# Patient Record
Sex: FEMALE | Race: BLACK OR AFRICAN AMERICAN | NOT HISPANIC OR LATINO | Employment: OTHER | URBAN - METROPOLITAN AREA
[De-identification: names, ages, dates, MRNs, and addresses within clinical notes are randomized per-mention and may not be internally consistent; named-entity substitution may affect disease eponyms.]

---

## 2018-11-14 ENCOUNTER — HISTORICAL (OUTPATIENT)
Dept: RADIOLOGY | Facility: HOSPITAL | Age: 38
End: 2018-11-14

## 2018-11-14 LAB — B-HCG SERPL QL: NEGATIVE

## 2019-08-12 ENCOUNTER — HISTORICAL (OUTPATIENT)
Dept: RESPIRATORY THERAPY | Facility: HOSPITAL | Age: 39
End: 2019-08-12

## 2019-09-05 ENCOUNTER — HISTORICAL (OUTPATIENT)
Dept: RADIOLOGY | Facility: HOSPITAL | Age: 39
End: 2019-09-05

## 2019-12-18 ENCOUNTER — HISTORICAL (OUTPATIENT)
Dept: ADMINISTRATIVE | Facility: HOSPITAL | Age: 39
End: 2019-12-18

## 2019-12-18 LAB
APPEARANCE, UA: ABNORMAL
BACTERIA #/AREA URNS AUTO: ABNORMAL /HPF
BILIRUB UR QL STRIP: NEGATIVE
COLOR UR: ABNORMAL
GLUCOSE (UA): NEGATIVE
HGB UR QL STRIP: NEGATIVE
HYALINE CASTS #/AREA URNS LPF: ABNORMAL /LPF
KETONES UR QL STRIP: ABNORMAL
LEUKOCYTE ESTERASE UR QL STRIP: 25 LEU/UL
NITRITE UR QL STRIP: NEGATIVE
PH UR STRIP: 5.5 [PH] (ref 4.5–8)
PROT UR QL STRIP: 10 MG/DL
RBC #/AREA URNS AUTO: ABNORMAL /HPF
SP GR UR STRIP: 1.03 (ref 1–1.03)
SQUAMOUS #/AREA URNS LPF: ABNORMAL /LPF
UROBILINOGEN UR STRIP-ACNC: NORMAL
WBC #/AREA URNS AUTO: ABNORMAL /HPF

## 2019-12-21 LAB — FINAL CULTURE: NO GROWTH

## 2022-04-11 ENCOUNTER — HISTORICAL (OUTPATIENT)
Dept: ADMINISTRATIVE | Facility: HOSPITAL | Age: 42
End: 2022-04-11

## 2022-04-24 VITALS
WEIGHT: 177.94 LBS | HEIGHT: 63 IN | DIASTOLIC BLOOD PRESSURE: 94 MMHG | BODY MASS INDEX: 31.53 KG/M2 | SYSTOLIC BLOOD PRESSURE: 134 MMHG

## 2022-05-04 NOTE — HISTORICAL [facility] CERNER
This is a historical note converted from Mario. Formatting and pictures may have been removed.  Please reference C[facility]s for original formatting and attached multimedia. Chief Complaint  C referral for AUB, heavy bleeding, cramping and painful urination. Periods last 2-3 days with severe cramps heavy flow for first day  History of Present Illness  40yo G0 presents as referral for dysmenorrhea and menorrhagia, as well as pain with urination. Of note, patient states that she has suffered from both problems for many many years and was seeing a gynecologist in [facility]. She will be moving to [place] next week.  ?  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 Went 9 years without a cycle in her 20s, was put on multiple different types of OCPs but would have an allergic reaction with swelling.  --has had hair on her chin requiring shaving since her 20s  --Now has monthly periods. bleeding and pain bad on first day. has to stay home from work. bleeds 3 pads/hr on first day. bleeds 2-3 days. Has never required transfusion.  --Sexually active with male partner, no concern for new STDs. no h/o abnormal pap smears  ?  Urinary symptoms:  --Frequently has pain with urination, most recently has had increased pain with urination for past several months. Describes pressure sensation, not really improved with urination. No increased frequency. No gross hematuria.  --takes Flomax daily to help her with urination  --History of left hydronephrosis--suspected congenital  ?  (08/01/2018 14:17 CDT CT Abdomen and Pelvis W/O Contrast)  KIDNEYS: The right kidney demonstrates no stone, hydronephrosis,  or hydroureter. No focal mass identified. The left kidney is  again  hydronephrotic with possible stricture at the ureteropelvic junction  (series 601 image 61). There is no obstructing stone identified. 4 mm  nonobstructing left superior pole stone is noted. [1]  ?   (09/05/2019 10:26 CDT US Pelvic Non-Obsetrics)  Anteverted uterus measures 9 cm in length. The endometrium measures 9  mm which is normal for age. There are nabothian cysts.  ?  Transvaginally the right ovary measures 3.5 x 2.5 x 2.2 cm. Left ovary  only seen transabdominally where it measures 4.9 x 4.6 x 2.9 cm. There  is no suspicious ovarian lesion.  ?  Small amount of free fluid is likely physiologic. [2]  Review of Systems  CONSTITUTIONAL: No weight loss, fever, chills, weakness or fatigue. Can carry out normal activities of daily living without SOB or CP.  HEENT: No visual loss, blurred vision, double vision. No hearing loss, sneezing, congestion, runny nose or sore throat.?  SKIN: No rash or itching.?  CARDIOVASCULAR: No chest pain, chest pressure or chest discomfort. No palpitations or edema.?  RESPIRATORY: No shortness of breath, cough or sputum.?  GASTROINTESTINAL: No anorexia, nausea, vomiting or diarrhea. No abdominal pain. No constipation or blood in stool.  GENITOURINARY:?+per HPI. No abnormal vaginal discharge, itching or irritation.  NEUROLOGICAL: No headache, dizziness, syncope, numbness or tingling in the extremities. No change in bowel or bladder control.?  MUSCULOSKELETAL: No muscle, back pain, joint pain or stiffness.?  HEMATOLOGIC: No anemia, bleeding or bruising.?  LYMPHATICS: No enlarged nodes.  PSYCHIATRIC: No current depression or anxiety.  ENDOCRINOLOGIC: No reports of sweating, cold or heat intolerance. No polyuria or polydipsia.?  ALLERGIES: No history of asthma, hives, eczema or rhinitis.  Physical Exam  Vitals & Measurements  T:?36.6? ?C (Oral)? HR:?101(Peripheral)? RR:?20? BP:?134/94?  HT:?160?cm? WT:?80.7?kg? BMI:?31.52? LMP:?11/17/2019 00:00 CST?  General: NAD, A/Ox3  HEENT: dark  stubble on chin  Neck: supple, no thyromegaly palpated, no lymphadenopathy palpated  Respiratory: non-labored breathing, no cough or SOB  Cardiovascular: RRR, extremities well-perfused  Abdomen: soft, obese, nondistended, nontender to palpation, no masses palpated. well-healed Pfannenstiel incision  Extremities: no edema, no calf tenderness  External genitalia: Normal female anatomy, no masses/lesions. Normal appearing urethral meatus. Normal appearing external anus. +significant tenderness produced with q-tip touch of urethra  Single digit exam:?no tenderness at the introitus. Non-tender levator ani, obturator, piriformis muscles bilaterally. +Tenderness at urethra, no tenderness at bladder base.No vaginal sidewall tenderness. Cervix mobile, nontender. Uterosacrals tender to palpation bilaterally.  Bimanual exam: 8 week size uterus, no fundal tenderness. Adnexa no masses or tenderness.  Speculum exam: Moist rugated vaginal mucosa with no gross blood or discharge in the vaginal vault. Cervix normal in appearance, no lesions or masses  Rectal: Normal tone, no masses or tenderness. No nodularities.  Assessment/Plan  40yo G0 presents as referral for dysmenorrhea and menorrhagia, as well as pain with urination. Likely component of chronic pain syndrome; patient will likely need close and regular follow up to evaluate multiple etiologies of pain, however patient is about to move out of state, so ability for follow up will be limited in this clinic. Will initiate workup for her to continue with new provider.  --AUB: periods are monthly, lasting 2-3 days, subjectively heavy. Discussed medical options, although patient is hesitant given reported allergy to OCPs. Briefly discussed surgical options, but since patient is about to move out of state she would like to pursue treatment with a new physician at her new home.  --Dysmenorrhea: control of cycle will likely improve dysmenorrhea. patient was told to avoid NSAIDS?because  of kidneys, although kidney function appears to be normal. Recommended scheduled Tylenol with occasional NSAID for pain control, heating pad, etc.?  --Dysuria: possible component of interstitial cystitis, recommended that patient keep voiding diary and discussed bladder irritants. collected cath UA and culture  --Urethritis: pronounced tenderness on exam. Will start trial of clobetasol ointment.  ?   Patient will follow up PRN.  Discussed with Dr. Pickard  ?   Citlali Estrada MD  [facility], [facility]   Problem List/Past Medical History  Ongoing  Obesity  Psoriasis  Stenosis of ureteropelvic junction  Historical  COPD type A  Seasonal allergies  Procedure/Surgical History  Monitoring of Respiratory Flow, External Approach (08/12/2019)  Monitoring of Respiratory Volume, External Approach (08/12/2019)  Spirometry, including graphic record, total and timed vital capacity, expiratory flow rate measurement(s), with or without maximal voluntary ventilation (08/12/2019)  T&A   Medications  albuterol 0.083% inhalation solution, 2.5 mg= 3 mL, INH, q6hr, PRN, 1 refills  Anoro Ellipta 62.5 mcg-25 mcg/inh inhalation powder, 1 puff(s), INH, Daily, 11 refills  cetirizine 10 mg oral tablet, See Instructions, 11 refills  Flomax 0.4 mg oral capsule, 0.4 mg= 1 cap(s), Oral, Daily, 11 refills  Flonase 50 mcg/inh nasal spray, 2 spray(s), Nasal, BID, 11 refills  hydrochlorothiazide-lisinopril 25 mg-20 mg oral tablet, 0.5 tab, Oral, Daily, 11 refills  ibuprofen 800 mg oral tablet, 800 mg= 1 tab(s), Oral, TID  Linzess 145 mcg oral capsule, 145 mcg= 1 cap(s), Oral, Daily  Linzess 72 mcg oral capsule, See Instructions, 2 refills  potassium chloride 10 mEq oral CAPSULE extended release, 10 mEq= 1 cap(s), Oral, Daily  ProAir HFA 90 mcg/inh inhalation aerosol, 2 puff(s), INH, QID  Allergies  Chocolate?(Itching)  Latex  Lortab?(hives)  Nuts  Tomatoes  iodine  Social History  Abuse/Neglect  No, No, Yes, 07/25/2019  No, 07/09/2019  Alcohol  Past,  08/01/2018  Employment/School  disabled, Work/School description: disabled. Highest education level: University degree(s). Operates hazardous equipment: No., 11/07/2018  Exercise  Self assessment: Poor condition., 11/07/2018  Nutrition/Health  Regular, 11/07/2018  Sexual  Sexually active: Yes. Gender Identity Identifies as female., 07/25/2019  Substance Use  Never, 08/01/2018  Tobacco  Never (less than 100 in lifetime), N/A, 12/18/2019  Family History  Diabetes mellitus type 2: Mother.  Hypertension.: Mother.  Immunizations  Vaccine Date Status   tetanus/diphtheria/pertussis, acel(Tdap) 12/13/2013 Recorded   Health Maintenance  Health Maintenance  ???Pending?(in the next year)  ??? ??OverDue  ??? ? ? ?COPD Maintenance-Spirometry due??and every?  ??? ? ? ?Diabetes Screening due??and every?  ??? ? ? ?Cervical Cancer Screening due??04/10/11??and every 3??year(s)  ??? ? ? ?Alcohol Misuse Screening due??01/01/19??and every 1??year(s)  ??? ??Due?  ??? ? ? ?Influenza Vaccine due??12/18/19??and every?  ??? ??Due In Future?  ??? ? ? ?Obesity Screening not due until??01/01/20??and every 1??year(s)  ??? ? ? ?Hypertension Management-BMP not due until??07/08/20??and every 1??year(s)  ??? ? ? ?ADL Screening not due until??08/20/20??and every 1??year(s)  ??? ? ? ?Blood Pressure Screening not due until??12/17/20??and every 1??year(s)  ??? ? ? ?Body Mass Index Check not due until??12/17/20??and every 1??year(s)  ??? ? ? ?Hypertension Management-Blood Pressure not due until??12/17/20??and every 1??year(s)  ???Satisfied?(in the past 1 year)  ??? ??Satisfied?  ??? ? ? ?ADL Screening on??08/20/19.??Satisfied by Garry Choudhury LPN  ??? ? ? ?Blood Pressure Screening on??12/18/19.??Satisfied by Karen Caro LPN  ??? ? ? ?Body Mass Index Check on??12/18/19.??Satisfied by Karen Caro LPN  ??? ? ? ?COPD Maintenance-Spirometry on??08/12/19.??Satisfied by Aurea Snigh  ??? ? ? ?Depression Screening  on??08/20/19.??Satisfied by Kei MAGAÑA, Garry Brewster  ??? ? ? ?Diabetes Screening on??07/09/19.??Satisfied by Rylee English  ??? ? ? ?Hypertension Management-Blood Pressure on??12/18/19.??Satisfied by Karen Caro LPN  ??? ? ? ?Obesity Screening on??12/18/19.??Satisfied by Karen Caro LPN  ?     [1]?CT Abdomen and Pelvis W/O Contrast; Marito HOLLY, Danilo Issa 08/01/2018 14:17 CDT  [2]?US Pelvic Non-Obsetrics; Hank HOLLY, Rashel Rider 09/05/2019 10:26 CDT   Reviewed the patients medical history, residents findings on physical exam, and the diagnosis with treatment plan. Care provided was reasonable and necessary.

## 2023-09-08 ENCOUNTER — HOSPITAL ENCOUNTER (EMERGENCY)
Facility: HOSPITAL | Age: 43
Discharge: HOME | End: 2023-09-08
Attending: STUDENT IN AN ORGANIZED HEALTH CARE EDUCATION/TRAINING PROGRAM
Payer: COMMERCIAL

## 2023-09-08 VITALS
RESPIRATION RATE: 18 BRPM | OXYGEN SATURATION: 99 % | WEIGHT: 174 LBS | SYSTOLIC BLOOD PRESSURE: 120 MMHG | HEIGHT: 63 IN | TEMPERATURE: 99 F | DIASTOLIC BLOOD PRESSURE: 80 MMHG | HEART RATE: 90 BPM | BODY MASS INDEX: 30.83 KG/M2

## 2023-09-08 DIAGNOSIS — R31.9 URINARY TRACT INFECTION WITH HEMATURIA, SITE UNSPECIFIED: ICD-10-CM

## 2023-09-08 DIAGNOSIS — N39.0 URINARY TRACT INFECTION WITH HEMATURIA, SITE UNSPECIFIED: ICD-10-CM

## 2023-09-08 DIAGNOSIS — N99.522 NEPHROSTOMY TUBE FAILURE WITH SUBSEQUENT URINE LEAK: Primary | ICD-10-CM

## 2023-09-08 LAB
ALBUMIN SERPL-MCNC: 3.8 G/DL (ref 3.5–5)
ALBUMIN/GLOB SERPL: 1 RATIO (ref 1.1–2)
ALP SERPL-CCNC: 74 UNIT/L (ref 40–150)
ALT SERPL-CCNC: 17 UNIT/L (ref 0–55)
APPEARANCE UR: ABNORMAL
AST SERPL-CCNC: 14 UNIT/L (ref 5–34)
B-HCG SERPL QL: NEGATIVE
BACTERIA #/AREA URNS AUTO: ABNORMAL /HPF
BASOPHILS # BLD AUTO: 0.05 X10(3)/MCL
BASOPHILS NFR BLD AUTO: 0.7 %
BILIRUB SERPL-MCNC: 0.3 MG/DL
BILIRUB UR QL STRIP.AUTO: NEGATIVE
BUN SERPL-MCNC: 11.4 MG/DL (ref 7–18.7)
CALCIUM SERPL-MCNC: 9.6 MG/DL (ref 8.4–10.2)
CAOX CRY URNS QL MICRO: ABNORMAL /HPF
CHLORIDE SERPL-SCNC: 107 MMOL/L (ref 98–107)
CO2 SERPL-SCNC: 27 MMOL/L (ref 22–29)
COLOR UR: YELLOW
CREAT SERPL-MCNC: 0.76 MG/DL (ref 0.55–1.02)
EOSINOPHIL # BLD AUTO: 0.06 X10(3)/MCL (ref 0–0.9)
EOSINOPHIL NFR BLD AUTO: 0.8 %
ERYTHROCYTE [DISTWIDTH] IN BLOOD BY AUTOMATED COUNT: 15.8 % (ref 11.5–17)
GFR SERPLBLD CREATININE-BSD FMLA CKD-EPI: >60 MLS/MIN/1.73/M2
GLOBULIN SER-MCNC: 3.7 GM/DL (ref 2.4–3.5)
GLUCOSE SERPL-MCNC: 123 MG/DL (ref 74–100)
GLUCOSE UR QL STRIP.AUTO: NEGATIVE
HCT VFR BLD AUTO: 36.4 % (ref 37–47)
HGB BLD-MCNC: 11.3 G/DL (ref 12–16)
IMM GRANULOCYTES # BLD AUTO: 0.03 X10(3)/MCL (ref 0–0.04)
IMM GRANULOCYTES NFR BLD AUTO: 0.4 %
KETONES UR QL STRIP.AUTO: NEGATIVE
LEUKOCYTE ESTERASE UR QL STRIP.AUTO: ABNORMAL
LYMPHOCYTES # BLD AUTO: 2.53 X10(3)/MCL (ref 0.6–4.6)
LYMPHOCYTES NFR BLD AUTO: 35.2 %
MCH RBC QN AUTO: 24.5 PG (ref 27–31)
MCHC RBC AUTO-ENTMCNC: 31 G/DL (ref 33–36)
MCV RBC AUTO: 78.8 FL (ref 80–94)
MONOCYTES # BLD AUTO: 0.29 X10(3)/MCL (ref 0.1–1.3)
MONOCYTES NFR BLD AUTO: 4 %
NEUTROPHILS # BLD AUTO: 4.22 X10(3)/MCL (ref 2.1–9.2)
NEUTROPHILS NFR BLD AUTO: 58.9 %
NITRITE UR QL STRIP.AUTO: POSITIVE
NRBC BLD AUTO-RTO: 0 %
PH UR STRIP.AUTO: 7 [PH]
PLATELET # BLD AUTO: 370 X10(3)/MCL (ref 130–400)
PMV BLD AUTO: 10.1 FL (ref 7.4–10.4)
POTASSIUM SERPL-SCNC: 3.9 MMOL/L (ref 3.5–5.1)
PROT SERPL-MCNC: 7.5 GM/DL (ref 6.4–8.3)
PROT UR QL STRIP.AUTO: ABNORMAL
RBC # BLD AUTO: 4.62 X10(6)/MCL (ref 4.2–5.4)
RBC #/AREA URNS AUTO: ABNORMAL /HPF
RBC UR QL AUTO: ABNORMAL
SODIUM SERPL-SCNC: 142 MMOL/L (ref 136–145)
SP GR UR STRIP.AUTO: 1.01 (ref 1–1.03)
SQUAMOUS #/AREA URNS AUTO: ABNORMAL /HPF
UROBILINOGEN UR STRIP-ACNC: 1
WBC # SPEC AUTO: 7.18 X10(3)/MCL (ref 4.5–11.5)
WBC #/AREA URNS AUTO: ABNORMAL /HPF

## 2023-09-08 PROCEDURE — 85025 COMPLETE CBC W/AUTO DIFF WBC: CPT | Performed by: PHYSICIAN ASSISTANT

## 2023-09-08 PROCEDURE — 25000003 PHARM REV CODE 250: Performed by: STUDENT IN AN ORGANIZED HEALTH CARE EDUCATION/TRAINING PROGRAM

## 2023-09-08 PROCEDURE — 99284 EMERGENCY DEPT VISIT MOD MDM: CPT | Mod: 25

## 2023-09-08 PROCEDURE — 87088 URINE BACTERIA CULTURE: CPT | Performed by: PHYSICIAN ASSISTANT

## 2023-09-08 PROCEDURE — 81025 URINE PREGNANCY TEST: CPT | Performed by: PHYSICIAN ASSISTANT

## 2023-09-08 PROCEDURE — 87040 BLOOD CULTURE FOR BACTERIA: CPT | Performed by: STUDENT IN AN ORGANIZED HEALTH CARE EDUCATION/TRAINING PROGRAM

## 2023-09-08 PROCEDURE — 81001 URINALYSIS AUTO W/SCOPE: CPT | Performed by: PHYSICIAN ASSISTANT

## 2023-09-08 PROCEDURE — 87077 CULTURE AEROBIC IDENTIFY: CPT | Performed by: PHYSICIAN ASSISTANT

## 2023-09-08 PROCEDURE — 80053 COMPREHEN METABOLIC PANEL: CPT | Performed by: PHYSICIAN ASSISTANT

## 2023-09-08 RX ORDER — HYDROCODONE BITARTRATE AND ACETAMINOPHEN 10; 325 MG/1; MG/1
1 TABLET ORAL
Status: COMPLETED | OUTPATIENT
Start: 2023-09-08 | End: 2023-09-08

## 2023-09-08 RX ORDER — CEFDINIR 300 MG/1
300 CAPSULE ORAL 2 TIMES DAILY
Qty: 20 CAPSULE | Refills: 0 | Status: SHIPPED | OUTPATIENT
Start: 2023-09-08 | End: 2023-09-18

## 2023-09-08 RX ORDER — HYDROCODONE BITARTRATE AND ACETAMINOPHEN 5; 325 MG/1; MG/1
1 TABLET ORAL EVERY 8 HOURS PRN
Qty: 15 TABLET | Refills: 0 | Status: SHIPPED | OUTPATIENT
Start: 2023-09-08 | End: 2023-09-13

## 2023-09-08 RX ORDER — CEFDINIR 300 MG/1
300 CAPSULE ORAL ONCE
Status: COMPLETED | OUTPATIENT
Start: 2023-09-08 | End: 2023-09-08

## 2023-09-08 RX ORDER — ACETAMINOPHEN 500 MG
1000 TABLET ORAL
Status: DISCONTINUED | OUTPATIENT
Start: 2023-09-08 | End: 2023-09-08

## 2023-09-08 RX ADMIN — CEFDINIR 300 MG: 300 CAPSULE ORAL at 06:09

## 2023-09-08 RX ADMIN — HYDROCODONE BITARTRATE AND ACETAMINOPHEN 1 TABLET: 10; 325 TABLET ORAL at 06:09

## 2023-09-08 NOTE — ED PROVIDER NOTES
Encounter Date: 9/8/2023    SCRIBE #1 NOTE: I, Sarina Jodie, am scribing for, and in the presence of,  Andrei Montejo MD. I have scribed the following portions of the note - Other sections scribed: HPI, ROS, PE.       History     Chief Complaint   Patient presents with    Flank Pain     Reports nephrostomy tube to the left side started leaking urine approx. 2 days ago with new onset left-sided flank pain. Reports has had to have tube changed in the past for same complaint.      Patient is a 43 year old female with a hx of chronic kidney disease, kidney stones, and HTN that presents to the ED for leaking to L nephrostomy tube onset 2 days ago. She reports the tube was last exchanged at the end of July and is due to be exchanged next week. Patient reports new onset of L flank pain described as burning. She also reports an intermittent fever.  Patient states she was to left-sided nephrostomy tubes.      The history is provided by the patient and medical records. No  was used.   Flank Pain  This is a new problem. The current episode started 2 days ago. The problem has been gradually worsening. Pertinent negatives include no chest pain, no abdominal pain and no shortness of breath. She has tried nothing for the symptoms.     Review of patient's allergies indicates:   Allergen Reactions    Iodine     Nicotine      Past Medical History:   Diagnosis Date    Asthma     Chronic kidney disease, unspecified     Constipation     Hydronephrosis     Hypertension      Past Surgical History:   Procedure Laterality Date    NEPHROSTOMY       No family history on file.  Social History     Tobacco Use    Smoking status: Never    Smokeless tobacco: Never     Review of Systems   Constitutional:  Positive for fever.   HENT:  Negative for sore throat.    Eyes:  Negative for visual disturbance.   Respiratory:  Negative for shortness of breath.    Cardiovascular:  Negative for chest pain.   Gastrointestinal:  Negative for  abdominal pain.   Genitourinary:  Positive for flank pain. Negative for dysuria.        Leaking L nephrostomy tube.    Musculoskeletal:  Negative for joint swelling.   Skin:  Negative for rash.   Neurological:  Negative for weakness.   Psychiatric/Behavioral:  Negative for confusion.        Physical Exam     Initial Vitals [09/08/23 0956]   BP Pulse Resp Temp SpO2   (!) 157/90 83 20 99 °F (37.2 °C) 100 %      MAP       --         Physical Exam    Nursing note and vitals reviewed.  Constitutional: She appears well-developed and well-nourished.   HENT:   Head: Normocephalic and atraumatic.   Eyes: EOM are normal. Pupils are equal, round, and reactive to light.   Neck:   Normal range of motion.  Cardiovascular:  Normal rate, regular rhythm, normal heart sounds and intact distal pulses.           No murmur heard.  Pulmonary/Chest: Breath sounds normal. No respiratory distress. She has no wheezes. She has no rales.   Abdominal: Abdomen is soft. She exhibits no distension. There is no abdominal tenderness. There is no rebound.   Genitourinary:    Genitourinary Comments: Patient has 2 left-sided nephrostomy tubes that are in place.  She has a blue tube which appears to be intact and draining into a Manning bag.  There is a smaller yellow tube which appears to be tied off.  Appears to be draining some yellow urine.  No surrounding erythema at the site of insertion.     Musculoskeletal:         General: No tenderness or edema. Normal range of motion.      Cervical back: Normal range of motion.     Neurological: She is alert. She has normal strength. No cranial nerve deficit. GCS score is 15. GCS eye subscore is 4. GCS verbal subscore is 5. GCS motor subscore is 6.   Skin: Skin is warm and dry. Capillary refill takes less than 2 seconds. No rash noted. No erythema.   Psychiatric: She has a normal mood and affect.         ED Course   Procedures  Labs Reviewed   CULTURE, URINE - Abnormal; Notable for the following components:        Result Value    Urine Culture >/= 100,000 colonies/ml Serratia marcescens (*)     All other components within normal limits   COMPREHENSIVE METABOLIC PANEL - Abnormal; Notable for the following components:    Glucose Level 123 (*)     Globulin 3.7 (*)     Albumin/Globulin Ratio 1.0 (*)     All other components within normal limits   URINALYSIS, REFLEX TO URINE CULTURE - Abnormal; Notable for the following components:    Appearance, UA Turbid (*)     Protein, UA 2+ (*)     Blood, UA 3+ (*)     Nitrites, UA Positive (*)     Leukocyte Esterase, UA 3+ (*)     All other components within normal limits   CBC WITH DIFFERENTIAL - Abnormal; Notable for the following components:    Hgb 11.3 (*)     Hct 36.4 (*)     MCV 78.8 (*)     MCH 24.5 (*)     MCHC 31.0 (*)     All other components within normal limits   URINALYSIS, MICROSCOPIC - Abnormal; Notable for the following components:    RBC, UA 21-50 (*)     WBC, UA 21-50 (*)     Bacteria, UA Many (*)     Calcium Oxalate Crystals, UA Rare (*)     All other components within normal limits   BLOOD CULTURE [facility] - Normal   BLOOD CULTURE [facility] - Normal   PREGNANCY TEST, URINE RAPID - Normal   CBC W/ AUTO DIFFERENTIAL    Narrative:     The following orders were created for panel order CBC auto differential.  Procedure                               Abnormality         Status                     ---------                               -----------         ------                     CBC with Differential[[id number]]       Abnormal            Final result                 Please view results for these tests on the individual orders.          Imaging Results              CT Abdomen Pelvis  Without Contrast (Final result)  Result time 09/08/23 16:39:11      Final result by Rashel Mckinney MD (09/08/23 16:39:11)                   Impression:      1. Left percutaneous nephrostomy catheter extends into the left renal pelvis.  Additionally there is a left ureteral stent but this terminates in  the distal left ureter rather than the bladder.  2. Other chronic findings above.      Electronically signed by: Rashel Mckinney  Date:    09/08/2023  Time:    16:39               Narrative:    EXAMINATION:  CT ABDOMEN PELVIS WITHOUT CONTRAST    CLINICAL HISTORY:  Flank pain, kidney stone suspected;L sided nephrostomy tube dislodged;    TECHNIQUE:  Helical acquisition through the abdomen and pelvis without IV contrast.  Lack of contrast limits evaluation of solid organs and vascular structures .  Three plane reconstructions were provided for review.  Automatic exposure control, adjustment of mA/kV or iterative reconstruction technique was used to reduce radiation.    COMPARISON:  No prior CT    FINDINGS:  The limited imaged lung bases are clear.    There is hepatic steatosis.  The gallbladder is contracted.  No significant abnormality of the spleen, pancreas or adrenals.    There is a left percutaneous nephrostomy catheter which appears to be in place.  Additionally there is a left ureteral stent though this terminates in the distal ureter.  Slight prominence of the left renal pelvis without gross hydronephrosis.    No bowel obstruction. No significant inflammatory changes of the bowel.  There is colonic diverticulosis.  Normal appendix.    Urinary bladder decompressed.  No significant pelvic free fluid.  Abdominal aorta normal in caliber.  Minimal atherosclerotic disease.    Mild degenerative change of the spine.                                       Medications   HYDROcodone-acetaminophen  mg per tablet 1 tablet (1 tablet Oral Given 9/8/23 1828)   cefdinir capsule 300 mg (300 mg Oral Given 9/8/23 1835)     Medical Decision Making  Problems Addressed:  Nephrostomy tube failure with subsequent urine leak: chronic illness or injury  Urinary tract infection with hematuria, site unspecified: acute illness or injury    Amount and/or Complexity of Data Reviewed  Labs: ordered.  Radiology: ordered and independent  interpretation performed.    Risk  OTC drugs.  Prescription drug management.  Decision regarding hospitalization.            Scribe Attestation:   Scribe #1: I performed the above scribed service and the documentation accurately describes the services I performed. I attest to the accuracy of the note.    Attending Attestation:           Physician Attestation for Scribe:  Physician Attestation Statement for Scribe #1: I, Andrei Montejo MD, reviewed documentation, as scribed by Sarina Feliciano in my presence, and it is both accurate and complete.             ED Course as of 09/11/23 0251   Fri Sep 08, 2023   1816 Patient resting comfortably.  Had a long discussion with the patient.  Patient states she is upset due to wait times at this facility.  Patient is also upset that we do not have Interventional Radiology available at this time.  Patient reports threatening to [place].  I have already offered transfer to [place] where IR capabilities may be available.  I have offered admission for consultation with IR on Monday when we have IR again, patient states she would prefer to go home.  Shared decision making used to determine disposition.  She does not appear septic.  Afebrile, no leukocytosis.  She is been started on antibiotics.  Shared decision making used to determine disposition.  Patient will prefer to go home. [RP]      ED Course User Index  [RP] Andrei Montejo MD               Medical Decision Making:   History:   I obtained history from: someone other than patient.       <> Summary of History: Collateral from patient's mother.  Old Medical Records: I decided to obtain old medical records.  Old Records Summarized: records from clinic visits, records from previous admission(s) and records from another hospital.       <> Summary of Records: Reviewed old records from [place].  Patient has a history of left-sided kidney stones.  Initial Assessment:   Leaking left-sided nephrostomy tube  Differential Diagnosis:    Judging by the patient's chief complaint and pertinent history, the patient has the following possible differential diagnoses, including but not limited to the following.  Some of these are deemed to be lower likelihood and some more likely based on my physical exam and history combined with possible lab work and/or imaging studies.   Please see the pertinent studies, and refer to the HPI.  Some of these diagnoses will take further evaluation to fully rule out, perhaps as an outpatient and the patient was encouraged to follow up when discharged for more comprehensive evaluation.      UTI, pyelo, dislodged tube, sepsis  Clinical Tests:   Lab Tests: Ordered and Reviewed  Radiological Study: Ordered and Reviewed  ED Management:    Patient is a 43-year-old female presents to the emergency department for leaking left-sided nephrostomy tube.  She has 2 tubes in when his blue that appears to be connected to a Manning bag that appears to be draining appropriately.  Another 1 is yellow that appears to be broken and tied off.  Patient states she has to nephrostomy tubes.  Imaging obtained.  No marked hydronephrosis.  Urine has been collected and sent for analysis.  Will start patient on cefdinir.  Patient states she is currently suing her urologist in [place].  Discussed with Radiology.  We do not have IR available at this time.  IR will be available on Monday.  The CT scan does not show any evidence of marked hydronephrosis.  The blue tube appears to be draining appropriately.  I suspect the yellow tube is draining.  Offered patient admission with continued IV antibiotics and IR consultation on Monday.  Offered patient for transfer to [place].  Patient states her mother can not drive [place] did not wish to go there.  Patient does not wish to be transferred to any other facility at this time.  Shared decision making used to determine disposition.  Patient prefers to follow-up outpatient, states she is going  back to [place] next week.  Patient very upset at the long wait times at this facility, I have apologized for the ED over crowding.  She is also upset and threatening to have the nurse fired, unclear why.  Patient has had appropriate workup including labs and imaging.  She has received antibiotics.  Urinalysis has been collected.  I have discussed with Radiology, IR unavailable at this time.  I have offered multiple options in terms of disposition.  After extensive discussion patient states she will go home and return if any new or worsening symptoms.  Will prescribe course of pain medication and antibiotics.      Clinical Impression:   Final diagnoses:  [N39.0, R31.9] Urinary tract infection with hematuria, site unspecified  [N99.522] Nephrostomy tube failure with subsequent urine leak (Primary)        ED Disposition Condition    Discharge Stable          ED Prescriptions       Medication Sig Dispense Start Date End Date Auth. Provider    cefdinir (OMNICEF) 300 MG capsule Take 1 capsule (300 mg total) by mouth 2 (two) times daily. for 10 days 20 capsule 9/8/2023 9/18/2023 Andrei Montejo MD    HYDROcodone-acetaminophen (NORCO) 5-325 mg per tablet Take 1 tablet by mouth every 8 (eight) hours as needed for Pain. 15 tablet 9/8/2023 9/13/2023 Andrei Montejo MD          Follow-up Information       Follow up With Specialties Details Why Contact Info    Your primary care physician.        Hossein Salinas MD Radiology   [address] [id number]  [phone]      Danilo Devi MD Diagnostic Radiology, Interventional Radiology   [address] [id number] [phone]      Josué Sheets MD Urology   [address] [place] [address]  [facility] [address]  [phone]               Andrei Montejo MD  09/11/23 0256

## 2023-09-08 NOTE — FIRST PROVIDER EVALUATION
Medical screening examination initiated.  I have conducted a focused provider triage encounter, findings are as follows:    Brief history of present illness:  43 y.o. female presents to the E.D. with c/o nephrostomy tube leaking urine approximately 2 days ago. Urologist is Dr. Batista in [place]. She reports she has had to have the tube changed approximately 5 weeks ago. She denies any fever. She reports increased pain.    There were no vitals filed for this visit.    Pertinent physical exam:  Awake, Alert, Oriented, Non labored breathing, left sided nephrostomy slight leaking    Brief workup plan:  labs, IR evaluation    Preliminary workup initiated; this workup will be continued and followed by the physician or advanced practice provider that is assigned to the patient when roomed.

## 2023-09-09 NOTE — DISCHARGE INSTRUCTIONS
Follow-up with your primary care physician.      You will need your nephrostomy tube exchange.      Your urine showed evidence of infection.  Please take antibiotic as prescribed.  Blood cultures have been obtained.      Return to the emergency room if you have any worsening pain, fever, abdominal pain, nausea, vomiting, difficulty breathing, or any other symptoms.

## 2023-09-10 LAB — BACTERIA UR CULT: ABNORMAL

## 2023-09-13 LAB
BACTERIA BLD CULT: NORMAL
BACTERIA BLD CULT: NORMAL